# Patient Record
Sex: MALE | Race: WHITE | Employment: FULL TIME | URBAN - METROPOLITAN AREA
[De-identification: names, ages, dates, MRNs, and addresses within clinical notes are randomized per-mention and may not be internally consistent; named-entity substitution may affect disease eponyms.]

---

## 2023-01-19 ENCOUNTER — APPOINTMENT (OUTPATIENT)
Dept: GENERAL RADIOLOGY | Age: 73
End: 2023-01-19
Attending: EMERGENCY MEDICINE
Payer: MEDICARE

## 2023-01-19 ENCOUNTER — APPOINTMENT (OUTPATIENT)
Dept: CT IMAGING | Age: 73
End: 2023-01-19
Attending: EMERGENCY MEDICINE
Payer: MEDICARE

## 2023-01-19 ENCOUNTER — HOSPITAL ENCOUNTER (EMERGENCY)
Age: 73
Discharge: ANOTHER HOSPITAL | End: 2023-01-19
Attending: EMERGENCY MEDICINE | Admitting: EMERGENCY MEDICINE
Payer: MEDICARE

## 2023-01-19 ENCOUNTER — APPOINTMENT (OUTPATIENT)
Dept: ULTRASOUND IMAGING | Age: 73
End: 2023-01-19
Attending: EMERGENCY MEDICINE
Payer: MEDICARE

## 2023-01-19 VITALS
WEIGHT: 160 LBS | HEIGHT: 67 IN | TEMPERATURE: 97.6 F | RESPIRATION RATE: 16 BRPM | OXYGEN SATURATION: 100 % | DIASTOLIC BLOOD PRESSURE: 40 MMHG | SYSTOLIC BLOOD PRESSURE: 106 MMHG | BODY MASS INDEX: 25.11 KG/M2 | HEART RATE: 61 BPM

## 2023-01-19 DIAGNOSIS — I71.03 TYPE 2 DISSECTION OF THORACOABDOMINAL AORTA (HCC): Primary | ICD-10-CM

## 2023-01-19 LAB
ABO + RH BLD: NORMAL
ALBUMIN SERPL-MCNC: 3.5 G/DL (ref 3.4–5)
ALBUMIN/GLOB SERPL: 1.1 (ref 0.8–1.7)
ALP SERPL-CCNC: 83 U/L (ref 45–117)
ALT SERPL-CCNC: 45 U/L (ref 16–61)
ANION GAP SERPL CALC-SCNC: 2 MMOL/L (ref 3–18)
AST SERPL-CCNC: 22 U/L (ref 10–38)
BASOPHILS # BLD: 0.1 K/UL (ref 0–0.1)
BASOPHILS NFR BLD: 1 % (ref 0–2)
BILIRUB SERPL-MCNC: 0.4 MG/DL (ref 0.2–1)
BLOOD GROUP ANTIBODIES SERPL: NORMAL
BUN SERPL-MCNC: 42 MG/DL (ref 7–18)
BUN/CREAT SERPL: 36 (ref 12–20)
CALCIUM SERPL-MCNC: 9 MG/DL (ref 8.5–10.1)
CALCULATED R AXIS, ECG10: 63 DEGREES
CALCULATED T AXIS, ECG11: 73 DEGREES
CHLORIDE SERPL-SCNC: 106 MMOL/L (ref 100–111)
CO2 SERPL-SCNC: 31 MMOL/L (ref 21–32)
CREAT SERPL-MCNC: 1.16 MG/DL (ref 0.6–1.3)
D DIMER PPP FEU-MCNC: >20 UG/ML(FEU)
DIAGNOSIS, 93000: NORMAL
DIFFERENTIAL METHOD BLD: ABNORMAL
EOSINOPHIL # BLD: 0.1 K/UL (ref 0–0.4)
EOSINOPHIL NFR BLD: 1 % (ref 0–5)
ERYTHROCYTE [DISTWIDTH] IN BLOOD BY AUTOMATED COUNT: 14.1 % (ref 11.6–14.5)
GLOBULIN SER CALC-MCNC: 3.2 G/DL (ref 2–4)
GLUCOSE SERPL-MCNC: 140 MG/DL (ref 74–99)
HCT VFR BLD AUTO: 43.3 % (ref 36–48)
HGB BLD-MCNC: 14.3 G/DL (ref 13–16)
IMM GRANULOCYTES # BLD AUTO: 0 K/UL (ref 0–0.04)
IMM GRANULOCYTES NFR BLD AUTO: 0 % (ref 0–0.5)
LYMPHOCYTES # BLD: 1.4 K/UL (ref 0.9–3.6)
LYMPHOCYTES NFR BLD: 19 % (ref 21–52)
MCH RBC QN AUTO: 29 PG (ref 24–34)
MCHC RBC AUTO-ENTMCNC: 33 G/DL (ref 31–37)
MCV RBC AUTO: 87.8 FL (ref 78–100)
MONOCYTES # BLD: 0.7 K/UL (ref 0.05–1.2)
MONOCYTES NFR BLD: 10 % (ref 3–10)
NEUTS SEG # BLD: 4.9 K/UL (ref 1.8–8)
NEUTS SEG NFR BLD: 69 % (ref 40–73)
NRBC # BLD: 0 K/UL (ref 0–0.01)
NRBC BLD-RTO: 0 PER 100 WBC
PLATELET # BLD AUTO: 250 K/UL (ref 135–420)
PMV BLD AUTO: 9.1 FL (ref 9.2–11.8)
POTASSIUM SERPL-SCNC: 5.2 MMOL/L (ref 3.5–5.5)
PROT SERPL-MCNC: 6.7 G/DL (ref 6.4–8.2)
Q-T INTERVAL, ECG07: 430 MS
QRS DURATION, ECG06: 92 MS
QTC CALCULATION (BEZET), ECG08: 450 MS
RBC # BLD AUTO: 4.93 M/UL (ref 4.35–5.65)
SODIUM SERPL-SCNC: 139 MMOL/L (ref 136–145)
SPECIMEN EXP DATE BLD: NORMAL
TROPONIN-HIGH SENSITIVITY: 46 NG/L (ref 0–78)
VENTRICULAR RATE, ECG03: 66 BPM
WBC # BLD AUTO: 7.1 K/UL (ref 4.6–13.2)

## 2023-01-19 PROCEDURE — 74011250636 HC RX REV CODE- 250/636

## 2023-01-19 PROCEDURE — 87040 BLOOD CULTURE FOR BACTERIA: CPT

## 2023-01-19 PROCEDURE — 70450 CT HEAD/BRAIN W/O DYE: CPT

## 2023-01-19 PROCEDURE — 74011000636 HC RX REV CODE- 636: Performed by: EMERGENCY MEDICINE

## 2023-01-19 PROCEDURE — 84484 ASSAY OF TROPONIN QUANT: CPT

## 2023-01-19 PROCEDURE — 85379 FIBRIN DEGRADATION QUANT: CPT

## 2023-01-19 PROCEDURE — 85025 COMPLETE CBC W/AUTO DIFF WBC: CPT

## 2023-01-19 PROCEDURE — 74011250636 HC RX REV CODE- 250/636: Performed by: EMERGENCY MEDICINE

## 2023-01-19 PROCEDURE — 86900 BLOOD TYPING SEROLOGIC ABO: CPT

## 2023-01-19 PROCEDURE — 96361 HYDRATE IV INFUSION ADD-ON: CPT | Performed by: EMERGENCY MEDICINE

## 2023-01-19 PROCEDURE — 71275 CT ANGIOGRAPHY CHEST: CPT

## 2023-01-19 PROCEDURE — 96374 THER/PROPH/DIAG INJ IV PUSH: CPT | Performed by: EMERGENCY MEDICINE

## 2023-01-19 PROCEDURE — 76870 US EXAM SCROTUM: CPT

## 2023-01-19 PROCEDURE — 80053 COMPREHEN METABOLIC PANEL: CPT

## 2023-01-19 PROCEDURE — 74176 CT ABD & PELVIS W/O CONTRAST: CPT

## 2023-01-19 PROCEDURE — 99285 EMERGENCY DEPT VISIT HI MDM: CPT | Performed by: EMERGENCY MEDICINE

## 2023-01-19 PROCEDURE — 71045 X-RAY EXAM CHEST 1 VIEW: CPT

## 2023-01-19 PROCEDURE — 96376 TX/PRO/DX INJ SAME DRUG ADON: CPT | Performed by: EMERGENCY MEDICINE

## 2023-01-19 PROCEDURE — 96375 TX/PRO/DX INJ NEW DRUG ADDON: CPT | Performed by: EMERGENCY MEDICINE

## 2023-01-19 RX ORDER — LORAZEPAM 2 MG/ML
1 INJECTION INTRAMUSCULAR
Status: COMPLETED | OUTPATIENT
Start: 2023-01-19 | End: 2023-01-19

## 2023-01-19 RX ORDER — MIDAZOLAM HYDROCHLORIDE 1 MG/ML
INJECTION, SOLUTION INTRAMUSCULAR; INTRAVENOUS
Status: COMPLETED
Start: 2023-01-19 | End: 2023-01-19

## 2023-01-19 RX ORDER — LORAZEPAM 2 MG/ML
INJECTION INTRAMUSCULAR
Status: COMPLETED
Start: 2023-01-19 | End: 2023-01-19

## 2023-01-19 RX ORDER — HALOPERIDOL 5 MG/ML
2 INJECTION INTRAMUSCULAR
Status: COMPLETED | OUTPATIENT
Start: 2023-01-19 | End: 2023-01-19

## 2023-01-19 RX ORDER — MIDAZOLAM HYDROCHLORIDE 1 MG/ML
3 INJECTION, SOLUTION INTRAMUSCULAR; INTRAVENOUS
Status: COMPLETED | OUTPATIENT
Start: 2023-01-19 | End: 2023-01-19

## 2023-01-19 RX ORDER — ONDANSETRON 2 MG/ML
4 INJECTION INTRAMUSCULAR; INTRAVENOUS
Status: COMPLETED | OUTPATIENT
Start: 2023-01-19 | End: 2023-01-19

## 2023-01-19 RX ORDER — HALOPERIDOL 5 MG/ML
3 INJECTION INTRAMUSCULAR
Status: COMPLETED | OUTPATIENT
Start: 2023-01-19 | End: 2023-01-19

## 2023-01-19 RX ORDER — ONDANSETRON 2 MG/ML
INJECTION INTRAMUSCULAR; INTRAVENOUS
Status: COMPLETED
Start: 2023-01-19 | End: 2023-01-19

## 2023-01-19 RX ORDER — LORAZEPAM 1 MG/1
1 TABLET ORAL
Status: DISCONTINUED | OUTPATIENT
Start: 2023-01-19 | End: 2023-01-19

## 2023-01-19 RX ORDER — LORAZEPAM 2 MG/ML
2 INJECTION INTRAMUSCULAR ONCE
Status: COMPLETED | OUTPATIENT
Start: 2023-01-19 | End: 2023-01-19

## 2023-01-19 RX ADMIN — HALOPERIDOL LACTATE 2 MG: 5 INJECTION, SOLUTION INTRAMUSCULAR at 16:30

## 2023-01-19 RX ADMIN — MIDAZOLAM 3 MG: 1 INJECTION INTRAMUSCULAR; INTRAVENOUS at 16:46

## 2023-01-19 RX ADMIN — LORAZEPAM 1 MG: 2 INJECTION INTRAMUSCULAR; INTRAVENOUS at 16:04

## 2023-01-19 RX ADMIN — HALOPERIDOL LACTATE 3 MG: 5 INJECTION, SOLUTION INTRAMUSCULAR at 16:04

## 2023-01-19 RX ADMIN — IOPAMIDOL 100 ML: 755 INJECTION, SOLUTION INTRAVENOUS at 15:29

## 2023-01-19 RX ADMIN — IOPAMIDOL 100 ML: 755 INJECTION, SOLUTION INTRAVENOUS at 18:09

## 2023-01-19 RX ADMIN — SODIUM CHLORIDE 1000 ML: 9 INJECTION, SOLUTION INTRAVENOUS at 14:17

## 2023-01-19 RX ADMIN — LORAZEPAM 2 MG: 2 INJECTION INTRAMUSCULAR at 18:14

## 2023-01-19 RX ADMIN — MIDAZOLAM HYDROCHLORIDE 3 MG: 1 INJECTION, SOLUTION INTRAMUSCULAR; INTRAVENOUS at 16:46

## 2023-01-19 RX ADMIN — SODIUM CHLORIDE 1000 ML: 9 INJECTION, SOLUTION INTRAVENOUS at 16:01

## 2023-01-19 RX ADMIN — ONDANSETRON HYDROCHLORIDE 4 MG: 2 INJECTION INTRAMUSCULAR; INTRAVENOUS at 15:48

## 2023-01-19 RX ADMIN — ONDANSETRON 4 MG: 2 INJECTION INTRAMUSCULAR; INTRAVENOUS at 15:48

## 2023-01-19 RX ADMIN — MIDAZOLAM HYDROCHLORIDE 2 MG: 1 INJECTION, SOLUTION INTRAMUSCULAR; INTRAVENOUS at 17:35

## 2023-01-19 RX ADMIN — MIDAZOLAM 2 MG: 1 INJECTION INTRAMUSCULAR; INTRAVENOUS at 17:35

## 2023-01-19 NOTE — ED NOTES
CT called this RN to inform that patient was not tolerating scan. Patient back to room at this time. This RN in patients room to establish another IV for meds/IVF & obtain blood cultures. Patient began to vomit. MD made aware & zofran given to patient. After medications administered to patient, CT called and informed that patient had received medication and was ready to come back to scan at this time.

## 2023-01-19 NOTE — ED NOTES
Prior to transporting patient to CT, patient became very aggitated. Multiple RNs and MD at bedside to redirect patient. meds given. Transported to CT via this RN and tech. Patient uncooperative in CT, more meds given via verbal order from MD. Scans able to be obtained. Patient back in room & resting quietly now. On 2L o2 for comfort after meds. On monitor. Call light within reach. Friend at bedside.

## 2023-01-19 NOTE — ED PROVIDER NOTES
[facility] EMERGENCY DEPT  EMERGENCY DEPARTMENT ENCOUNTER       Pt Name: Sarah Roger  MRN: [id number]  [place] 1950  Date of evaluation: 1/19/2023  Provider: Timo Conteh MD   PCP: Ladonna Hutchins MD  Note Started: 5:33 PM 1/19/23     CHIEF COMPLAINT       Chief Complaint   Patient presents with    Vomiting    Testicle Pain    Dizziness        HISTORY OF PRESENT ILLNESS: 1 or more elements      History From: Patient and EMS  HPI Limitations : None     Sarah Roger is a 67 y.o. male who presents to ED via EMS, EMS called at scene for patient dizzy, may have passed out, vomiting. Patient was drywalling, he states he suddenly became sweaty, then became dizzy, thinks he went down to the floor but denies passing out denies falling. 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 Patient states he also began having pain shooting down to the left testicle. At EMS arrival patient diaphoretic, alert, no chest pain, no headache, initial blood pressure 70/40, was given to 50 cc normal saline and blood pressure came up to 80/40. Patient alert, somewhat lethargic but able to answer questions, he has hearing difficulties but attempts to answer questions and is able to follow commands. Initial blood pressure was 97/52. She is rather poor historian due to decreased hearing. Nursing Notes were all reviewed and agreed with or any disagreements were addressed in the HPI. REVIEW OF SYSTEMS      Review of Systems     Positives and Pertinent negatives as per HPI. PAST HISTORY     Past Medical History:  No past medical history on file. Past Surgical History:  No past surgical history on file. Family History:  No family history on file.     Social History:  Social History     Tobacco Use    Smoking status: Never   Substance Use Topics    Alcohol use: Never    Drug use: Never       Allergies:  No Known Allergies    CURRENT MEDICATIONS Previous Medications    No medications on file       SCREENINGS               No data recorded         PHYSICAL EXAM      Vitals:    01/19/23 1732 01/19/23 1736 01/19/23 1737 01/19/23 1738   BP: 113/73      Pulse: 81 68  68   Resp: 17 17  17   Temp:       SpO2:   100%    Weight:       Height:         Physical Exam    Nursing notes and vital signs reviewed  As above  Constitutional: Pale appearing, is uncomfortable, no acute distress at arrival.   Head: Normocephalic, Atraumatic  Eyes: EOMI, PERRL  Neck: Supple  Cardiovascular: Regular rate and rhythm, no murmurs, rubs, or gallops  Chest: Normal work of breathing and chest excursion bilaterally  Lungs: Clear to ausculation bilaterally  Abdomen: Soft, non tender, non distended, normoactive bowel sounds, tenderness left testicle, no swelling.   Back: No evidence of trauma or deformity  Extremities: No evidence of trauma or deformity, no LE edema  Skin: Warm and dry, normal cap refill  Neuro: Alert and appropriate, CN intact, normal speech, strength and sensation full and symmetric bilaterally, normal gait, normal coordination  Psychiatric: Normal mood and affect     DIAGNOSTIC RESULTS   LABS:     Recent Results (from the past 12 hour(s))   EKG, 12 LEAD, INITIAL    Collection Time: 01/19/23  1:59 PM   Result Value Ref Range    Ventricular Rate 66 BPM    QRS Duration 92 ms    Q-T Interval 430 ms    QTC Calculation (Bezet) 450 ms    Calculated R Axis 63 degrees    Calculated T Axis 73 degrees    Diagnosis       Atrial fibrillation  Abnormal ECG  No previous ECGs available     CBC WITH AUTOMATED DIFF    Collection Time: 01/19/23  2:10 PM   Result Value Ref Range    WBC 7.1 4.6 - 13.2 K/uL    RBC 4.93 4.35 - 5.65 M/uL    HGB 14.3 13.0 - 16.0 g/dL    HCT 43.3 36.0 - 48.0 %    MCV 87.8 78.0 - 100.0 FL    MCH 29.0 24.0 - 34.0 PG    MCHC 33.0 31.0 - 37.0 g/dL    RDW 14.1 11.6 - 14.5 %    PLATELET 257 744 - 450 K/uL    MPV 9.1 (L) 9.2 - 11.8 FL    NRBC 0.0 0  WBC ABSOLUTE NRBC 0.00 0.00 - 0.01 K/uL    NEUTROPHILS 69 40 - 73 %    LYMPHOCYTES 19 (L) 21 - 52 %    MONOCYTES 10 3 - 10 %    EOSINOPHILS 1 0 - 5 %    BASOPHILS 1 0 - 2 %    IMMATURE GRANULOCYTES 0 0.0 - 0.5 %    ABS. NEUTROPHILS 4.9 1.8 - 8.0 K/UL    ABS. LYMPHOCYTES 1.4 0.9 - 3.6 K/UL    ABS. MONOCYTES 0.7 0.05 - 1.2 K/UL    ABS. EOSINOPHILS 0.1 0.0 - 0.4 K/UL    ABS. BASOPHILS 0.1 0.0 - 0.1 K/UL    ABS. IMM. GRANS. 0.0 0.00 - 0.04 K/UL    DF AUTOMATED     METABOLIC PANEL, COMPREHENSIVE    Collection Time: 01/19/23  2:10 PM   Result Value Ref Range    Sodium 139 136 - 145 mmol/L    Potassium 5.2 3.5 - 5.5 mmol/L    Chloride 106 100 - 111 mmol/L    CO2 31 21 - 32 mmol/L    Anion gap 2 (L) 3.0 - 18 mmol/L    Glucose 140 (H) 74 - 99 mg/dL    BUN 42 (H) 7.0 - 18 MG/DL    Creatinine 1.16 0.6 - 1.3 MG/DL    BUN/Creatinine ratio 36 (H) 12 - 20      eGFR >60 >60 ml/min/1.73m2    Calcium 9.0 8.5 - 10.1 MG/DL    Bilirubin, total 0.4 0.2 - 1.0 MG/DL    ALT (SGPT) 45 16 - 61 U/L    AST (SGOT) 22 10 - 38 U/L    Alk. phosphatase 83 45 - 117 U/L    Protein, total 6.7 6.4 - 8.2 g/dL    Albumin 3.5 3.4 - 5.0 g/dL    Globulin 3.2 2.0 - 4.0 g/dL    A-G Ratio 1.1 0.8 - 1.7     TROPONIN-HIGH SENSITIVITY    Collection Time: 01/19/23  2:10 PM   Result Value Ref Range    Troponin-High Sensitivity 46 0 - 78 ng/L   D DIMER    Collection Time: 01/19/23  2:10 PM   Result Value Ref Range    D DIMER >20.00 (H) <0.46 ug/ml(FEU)   TYPE & SCREEN    Collection Time: 01/19/23  5:30 PM   Result Value Ref Range    Crossmatch Expiration 01/22/2023,2359     ABO/Rh(D) O POSITIVE     Antibody screen NEG         EKG:   EKG interpretation: (Preliminary)  EKG read by Dr. Santiago Dsouza at EKG: there are no previous tracings available for comparison, nonspecific ST and T waves changes, atrial fibrillation, rate 66.        RADIOLOGY:  Non-plain film images such as CT, Ultrasound and MRI are read by the radiologist. Plain radiographic images are visualized and preliminarily interpreted by the ED Provider with the below findings:     chest X-ray  No acute process     Interpretation per the Radiologist below, if available at the time of this note:     CT HEAD WO CONT    Result Date: 1/19/2023  INDICATION: Near syncope COMPARISON: None EXAM: Unenhanced axial CT imaging of the head is obtained with coronal and sagittal reformations. CT dose reduction was achieved through use of a standardized protocol tailored for this examination and automatic exposure control for dose modulation. FINDINGS: Intra-axial attenuation and morphology are normal. There is no unenhanced CT imaging evidence for acute infarction,  intracranial hemorrhage or mass effect. No extra-axial collection is shown. The partly visualized orbits and paranasal sinuses appear normal. No osseous abnormality is demonstrated. There are incidental foci of air demonstrated within the2 sagittal sinus and within the left temporal vein, likely injection related. No acute findings. CTA CHEST W OR W WO CONT    Result Date: 1/19/2023  INDICATION: near syncope COMPARISON: Earlier chest x-ray EXAM: Precontrast localizer was first performed to verify the levels of the pulmonary arteries. Subsequently, contiguous axial images were obtained after the rapid IV bolus administration of 100 cc Isovue-370, followed by thin section axial reconstructions with multiplanar reformations. Three-dimensional post - processing was performed. CT dose reduction was achieved through use of a standardized protocol tailored for this examination and automatic exposure control for dose modulation. EXAM: Unenhanced CT imaging of the abdomen and pelvis with coronal sagittal reformations. CT dose reduction was achieved through use of a standardized protocol tailored for this examination and automatic exposure control for dose modulation. FINDINGS: CT pulmonary arteriogram: There is no evidence for pulmonary embolism.  An ascending thoracic aortic aneurysm is shown with diameter of 6 cm. The proximal arch shows a diameter 5.1 cm. The mid arch 4.6 and 3.2 cm, the distal arch 2.7 cm and the descending thoracic aorta 3.4,, 2.7 and 3.0 cm (at the isthmus). There is a small amount of contrast within the left ventricle and aorta. The aortic contrast is nonuniform, with an appearance of 810 oh through the arch which is approximately half that of the diameter of the arch. The finding raises the concern for an aortic dissection. Further evaluation with CT arteriography of the thoracic aorta is recommended. There is no lung pericardial effusion or mediastinal hematoma. No mediastinal or hilar mass is shown. Cardiac size is upper limits of normal and mild-moderate coronary arterial calcifications are shown within the left anterior descending coronary artery. The lungs are clear. No chest wall or axillary mass is shown. No substantial osseous abnormality is demonstrated. Abdomen and pelvis: Proximal abdominal aortic AP dimension is 3.1 cm, renal level dimension 2.8 cm and infrarenal dimension 2.2 and 2.3 cm. Proximal to the bifurcation, AP dimension is 1.9 cm. The unenhanced liver, gallbladder, pancreas, spleen, adrenal glands and kidneys appear normal. There is no free peroneal air or fluid. No retroperitoneal mass or adenopathy is shown. There is no bowel dilation or bowel wall thickening. An anastomosis in the proximal sigmoid region of the colon is shown. Urinary bladder contours appear normal. There bilateral lung inguinal hernias containing fat, larger on the right. No substantial osseous abnormality is shown though there are degenerative changes noted in the lower lumbar spine and both hips. 1. No evidence for pulmonary embolism. 2. Ascending thoracic aortic aneurysm with ectasia through descending thoracic aorta. As above, there is concern for aortic dissection. Further evaluation with CT arteriography is recommended.  3. No acute findings of the abdomen and pelvis. Incidental is as above. The findings were called to Dr. Kortney Landa on 1/19/2023 at 5:20 PM by myself. [phone]     CT ABD PELV WO CONT    Result Date: 1/19/2023  INDICATION: near syncope COMPARISON: Earlier chest x-ray EXAM: Precontrast localizer was first performed to verify the levels of the pulmonary arteries. Subsequently, contiguous axial images were obtained after the rapid IV bolus administration of 100 cc Isovue-370, followed by thin section axial reconstructions with multiplanar reformations. Three-dimensional post - processing was performed. CT dose reduction was achieved through use of a standardized protocol tailored for this examination and automatic exposure control for dose modulation. EXAM: Unenhanced CT imaging of the abdomen and pelvis with coronal sagittal reformations. CT dose reduction was achieved through use of a standardized protocol tailored for this examination and automatic exposure control for dose modulation. FINDINGS: CT pulmonary arteriogram: There is no evidence for pulmonary embolism. An ascending thoracic aortic aneurysm is shown with diameter of 6 cm. The proximal arch shows a diameter 5.1 cm. The mid arch 4.6 and 3.2 cm, the distal arch 2.7 cm and the descending thoracic aorta 3.4,, 2.7 and 3.0 cm (at the isthmus). There is a small amount of contrast within the left ventricle and aorta. The aortic contrast is nonuniform, with an appearance of 810 oh through the arch which is approximately half that of the diameter of the arch. The finding raises the concern for an aortic dissection. Further evaluation with CT arteriography of the thoracic aorta is recommended. There is no lung pericardial effusion or mediastinal hematoma. No mediastinal or hilar mass is shown. Cardiac size is upper limits of normal and mild-moderate coronary arterial calcifications are shown within the left anterior descending coronary artery. The lungs are clear.  No chest wall or axillary mass is shown. No substantial osseous abnormality is demonstrated. Abdomen and pelvis: Proximal abdominal aortic AP dimension is 3.1 cm, renal level dimension 2.8 cm and infrarenal dimension 2.2 and 2.3 cm. Proximal to the bifurcation, AP dimension is 1.9 cm. The unenhanced liver, gallbladder, pancreas, spleen, adrenal glands and kidneys appear normal. There is no free peroneal air or fluid. No retroperitoneal mass or adenopathy is shown. There is no bowel dilation or bowel wall thickening. An anastomosis in the proximal sigmoid region of the colon is shown. Urinary bladder contours appear normal. There bilateral lung inguinal hernias containing fat, larger on the right. No substantial osseous abnormality is shown though there are degenerative changes noted in the lower lumbar spine and both hips. 1. No evidence for pulmonary embolism. 2. Ascending thoracic aortic aneurysm with ectasia through descending thoracic aorta. As above, there is concern for aortic dissection. Further evaluation with CT arteriography is recommended. 3. No acute findings of the abdomen and pelvis. Incidental is as above. The findings were called to Dr. Marika Gates on 1/19/2023 at 5:20 PM by myself. [phone]     XR CHEST PORT    Result Date: 1/19/2023  INDICATION: Near syncope EXAM:  AP CHEST RADIOGRAPH COMPARISON: None FINDINGS: AP portable view of the chest demonstrates a normal cardiomediastinal silhouette. There is no edema, effusion, consolidation, or pneumothorax. The osseous structures are unremarkable. No acute process. CTA CHEST ABD PELV W CONT    Result Date: 1/19/2023  INDICATION: Thoracic aneurysm COMPARISON: Earlier CT angiogram of the pulmonary arteries. EXAM: Precontrast localizer was first performed to verify the levels of the aorta.  Subsequently, contiguous axial images were obtained of the thoracic and abdominal aorta after the rapid IV bolus administration of 100 cc Isovue-370, followed by thin section axial reconstructions with multiplanar reformations. Three-dimensional post - processing was performed. CT dose reduction was achieved through use of a standardized protocol tailored for this examination and automatic exposure control for dose modulation. FINDINGS: Diffuse aortic ectasia with ascending thoracic aortic aneurysm is again shown with transverse dimension measuring up to 6 cm. A type A dissection is demonstrated with several intimal membranes. There is flow demonstrated within the true and false lumen throughout the course of the dissection. The dissection involves the ascending aorta including the origin of the aortic arch at the aortic valve leaflets. Aortic dissection extends into the great vessels with supply by both true and false lumens. There is no demonstration of pericardial effusion or mediastinal hematoma. Within the abdomen, dissection extends through the common iliac arteries bilaterally, with supply of the major vessels by the true lumen. Contrast material opacifies the true and false lumens within the abdomen as well. No major vessel arterial occlusion is shown. There is uniform perfusion of the kidneys bilaterally. Arterial phase imaging of the liver is unremarkable with 2 subcentimeter hyperattenuating lesions of doubtful significance. The gallbladder, pancreas, spleen, adrenal glands and bowel appear unremarkable. There is no free peroneal air or fluid. No retroperitoneal mass or hematoma is shown. Incidental small to moderate right inguinal hernia containing fat is noted. No substantial osseous abnormality is demonstrated. Type A aortic dissection as above. The findings were called to Dr. Gilberto Kohli on 1/19/2023 at 6:21 PM by myself. [id number]  [facility]    Result Date: 1/19/2023  EXAM:  US SCROTUM/TESTICLES W DOPPLER INDICATION:   tesicle pain COMPARISON: None. TECHNIQUE: Real-time sonography of the scrotum was performed with a high frequency linear transducer.  Multiple static images were obtained. Color Doppler evaluation was also performed. FINDINGS: The testes are uniform in echotexture with demonstration of a septated cyst on the right in the region of the rete testis measuring 7 x 5 x 4 mm. An adjacent smaller cyst is shown measuring 3 mm. There is Doppler flow documented in the testes bilaterally which appears normal. The right testis measures 3.9 x 2.3 x 2.9 cm and the left testis measures 4.1 x 1.9 x 2.8 cm. 2 small cysts of the right epididymal head is shown, measuring 4 mm and 3 mm in size. The left epididymal head shows a cyst measuring 8 x 6 x 3 mm. No epididymal enlargement or hypervascularity is shown. No hydrocele is demonstrated. Right testicular bilateral epididymal cysts. Study otherwise within normal limits. PROCEDURES   Unless otherwise noted below, none      CRITICAL CARE TIME   6:56 PM  I have spent 150 minutes of critical care time involved in lab review, consultations with specialist, family decision-making, and documentation. During this entire length of time I was immediately available to the patient. Critical Care: The reason for providing this level of medical care for this critically ill patient was due a critical illness that impaired one or more vital organ systems such that there was a high probability of imminent or life threatening deterioration in the patients condition. This care involved high complexity decision making to assess, manipulate, and support vital system functions, to treat this degreee vital organ system failure and to prevent further life threatening deterioration of the patients condition.       EMERGENCY DEPARTMENT COURSE and DIFFERENTIAL DIAGNOSIS/MDM   Vitals:    Vitals:    01/19/23 1732 01/19/23 1736 01/19/23 1737 01/19/23 1738   BP: 113/73      Pulse: 81 68  68   Resp: 17 17  17   Temp:       SpO2:   100%    Weight:       Height:            Patient was given the following medications:  Medications   sodium chloride 0.9 % bolus infusion 1,000 mL (0 mL IntraVENous IV Completed 1/19/23 1601)   iopamidoL (ISOVUE-370) 370 mg iodine /mL (76 %) injection 100 mL (100 mL IntraVENous Given 1/19/23 1529)   ondansetron (ZOFRAN) injection 4 mg (4 mg IntraVENous Given 1/19/23 1548)   sodium chloride 0.9 % bolus infusion 1,000 mL (0 mL IntraVENous IV Completed 1/19/23 1736)   haloperidol lactate (HALDOL) injection 3 mg (3 mg IntraVENous Given 1/19/23 1604)   LORazepam (ATIVAN) injection 1 mg (1 mg IntraVENous Given 1/19/23 1604)   haloperidol lactate (HALDOL) injection 2 mg (2 mg IntraVENous Given 1/19/23 1630)   midazolam (VERSED) injection 3 mg (3 mg IntraVENous Given 1/19/23 1646)   midazolam (VERSED) injection 3 mg (2 mg IntraVENous Given 1/19/23 1735)   iopamidoL (ISOVUE-370) 370 mg iodine /mL (76 %) injection 100 mL (100 mL IntraVENous Given 1/19/23 1809)   LORazepam (ATIVAN) injection 2 mg (2 mg IntraVENous Given 1/19/23 1814)       CONSULTS: (Who and What was discussed)  Discussed with the radiologist regarding CTA chest, radiologist requested repeat CTA aorta, there is ascending aortic aneurysm involving the arch not clear on for CTA with is type a type B. Radiologist called back and confirmed type a dissection and advised transfer to cardiothoracic surgeon. Chronic Conditions:     Social Determinants affecting Dx or Tx: None    Records Reviewed (source and summary): Nursing Notes, Old Medical Records, Previous electrocardiograms, Ambulance Run Sheet, Previous Radiology Studies, and Previous Laboratory Studies    CC/HPI Summary, DDx, ED Course, and Reassessment:   70-year-old male brought to ED via EMS after near syncope episode while he was at work, [employer], became diaphoretic, dizzy, almost passed out, had a large emesis, began having left testicular pain. Blood pressure 70/40 by EMS patient given to 50 cc normal saline and blood pressure 97/58 at ED arrival.  Patient poor historian due to poor hearing.   He denies any headache, chest pain, shortness of breath, simply stated he was very uncomfortable. He has  history of DVTs, remote past, is not on any anticoagulation. Exam unremarkable except patient with pale, uncomfortable but in no acute distress. He was somewhat confused but initially thought this may be due to the poor hearing. Initial evaluation involved CT scan head, CBC, CMP, troponin, D-dimer, ultrasound of left testicle. Patient increased increasingly becoming more confused since ED arrival.  He required sedation for CT scan of head, also advised radiology to get CTA of chest while patient was in radiology. With line was placed right. Hydrated and blood pressure increased to normal.  Radiology confirmed type A dissection after 2 CTAs of chest.  Attempted transfer to [facility] and I discussed with Dr. Boris Mendes who advised transfer to [facility] as they do not do aortic arch repair at [facility]. I then discussed patient with Dr. Brennan Carlton after who accepted patient directed to ED. He suggested the patient is stable not to intubate. Patient being transferred to [facility] via air ambulance. Patient's wife was called, Casimiro Michaud [phone], advised they are  although she has a different name, she advised to do everything possible for the patient. She is aware patient is being transferred to [facility]. Disposition Considerations (Tests not done, Shared Decision Making, Pt Expectation of Test or Tx.):    FINAL IMPRESSION     1. 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 . The results of their tests and reasons for their transfer have been discussed with the patient and/or available family. The patient/family has conveyed agreement and understanding for the need to be admitted and for their admission diagnosis.  Consultation has been made with Mil Hutchins Conner Stevens MD, who agrees to accept the transfer. I am the Primary Clinician of Record. Diya Patterson MD (electronically signed)    (Please note that parts of this dictation were completed with voice recognition software. Quite often unanticipated grammatical, syntax, homophones, and other interpretive errors are inadvertently transcribed by the computer software. Please disregards these errors.  Please excuse any errors that have escaped final proofreading.)

## 2023-01-19 NOTE — ED TRIAGE NOTES
Patient arrived via ems from workplace. 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  Ems administered and b/p was 70/40 and ems administered 250 of normal saline and b/p came up to 80/40

## 2023-01-22 LAB
BACTERIA SPEC CULT: NORMAL
BACTERIA SPEC CULT: NORMAL
SERVICE CMNT-IMP: NORMAL
SERVICE CMNT-IMP: NORMAL